# Patient Record
Sex: FEMALE | URBAN - METROPOLITAN AREA
[De-identification: names, ages, dates, MRNs, and addresses within clinical notes are randomized per-mention and may not be internally consistent; named-entity substitution may affect disease eponyms.]

---

## 2024-08-15 ENCOUNTER — APPOINTMENT (RX ONLY)
Dept: URBAN - METROPOLITAN AREA CLINIC 190 | Facility: CLINIC | Age: 24
Setting detail: DERMATOLOGY
End: 2024-08-15

## 2024-08-15 VITALS — WEIGHT: 248.6 LBS | HEIGHT: 63 IN

## 2024-08-15 DIAGNOSIS — E66.01 MORBID (SEVERE) OBESITY DUE TO EXCESS CALORIES: ICD-10-CM

## 2024-08-15 DIAGNOSIS — N62 HYPERTROPHY OF BREAST: ICD-10-CM

## 2024-08-15 DIAGNOSIS — L30.4 ERYTHEMA INTERTRIGO: ICD-10-CM

## 2024-08-15 DIAGNOSIS — M54.2 CERVICALGIA: ICD-10-CM

## 2024-08-15 PROCEDURE — ? COUNSELING - MACROMASTIA

## 2024-08-15 PROCEDURE — 99203 OFFICE O/P NEW LOW 30 MIN: CPT

## 2024-08-15 PROCEDURE — ? OTHER

## 2024-08-15 PROCEDURE — ? COUNSELING - OBESITY

## 2024-08-15 ASSESSMENT — LOCATION ZONE DERM: LOCATION ZONE: TRUNK

## 2024-08-15 ASSESSMENT — LOCATION DETAILED DESCRIPTION DERM: LOCATION DETAILED: LOWER STERNUM

## 2024-08-15 ASSESSMENT — LOCATION SIMPLE DESCRIPTION DERM: LOCATION SIMPLE: CHEST

## 2024-08-15 NOTE — PROCEDURE: COUNSELING - MACROMASTIA
Height (Cm): 160.02
Anticipated Tissue To Be Removed (In Grams), Right: 400-450
Detail Level: Simple
Count Minor/Major Decisions Toward Mdm (Not Cosmetic)?: No
Anticipated Tissue To Be Removed (In Grams), Left: 450-500

## 2024-08-15 NOTE — PROCEDURE: OTHER
Detail Level: Zone
Render Risk Assessment In Note?: no
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
Other (Free Text): See above regarding macromastia associated intertrigo.